# Patient Record
Sex: MALE | Race: WHITE
[De-identification: names, ages, dates, MRNs, and addresses within clinical notes are randomized per-mention and may not be internally consistent; named-entity substitution may affect disease eponyms.]

---

## 2020-03-02 ENCOUNTER — HOSPITAL ENCOUNTER (EMERGENCY)
Dept: HOSPITAL 5 - ED | Age: 35
LOS: 4 days | Discharge: PSYCHIATRIC OR SUBSTANCE USE FACILITY | End: 2020-03-06
Payer: SELF-PAY

## 2020-03-02 DIAGNOSIS — R45.851: ICD-10-CM

## 2020-03-02 DIAGNOSIS — F41.9: ICD-10-CM

## 2020-03-02 DIAGNOSIS — F32.9: Primary | ICD-10-CM

## 2020-03-02 LAB
ALBUMIN SERPL-MCNC: 4.9 G/DL (ref 3.9–5)
ALT SERPL-CCNC: 15 UNITS/L (ref 7–56)
BASOPHILS # (AUTO): 0 K/MM3 (ref 0–0.1)
BASOPHILS NFR BLD AUTO: 0.6 % (ref 0–1.8)
BENZODIAZEPINES SCREEN,URINE: (no result)
BILIRUB UR QL STRIP: (no result)
BLOOD UR QL VISUAL: (no result)
BUN SERPL-MCNC: 14 MG/DL (ref 9–20)
BUN/CREAT SERPL: 16 %
CALCIUM SERPL-MCNC: 9.7 MG/DL (ref 8.4–10.2)
EOSINOPHIL # BLD AUTO: 0.1 K/MM3 (ref 0–0.4)
EOSINOPHIL NFR BLD AUTO: 1 % (ref 0–4.3)
HCT VFR BLD CALC: 43.3 % (ref 35.5–45.6)
HEMOLYSIS INDEX: 28
HGB BLD-MCNC: 14.4 GM/DL (ref 11.8–15.2)
LYMPHOCYTES # BLD AUTO: 1.7 K/MM3 (ref 1.2–5.4)
LYMPHOCYTES NFR BLD AUTO: 30.9 % (ref 13.4–35)
MCHC RBC AUTO-ENTMCNC: 33 % (ref 32–34)
MCV RBC AUTO: 90 FL (ref 84–94)
METHADONE SCREEN,URINE: (no result)
MONOCYTES # (AUTO): 0.4 K/MM3 (ref 0–0.8)
MONOCYTES % (AUTO): 7.3 % (ref 0–7.3)
MUCOUS THREADS #/AREA URNS HPF: (no result) /HPF
OPIATE SCREEN,URINE: (no result)
PH UR STRIP: 7 [PH] (ref 5–7)
PLATELET # BLD: 270 K/MM3 (ref 140–440)
PROT UR STRIP-MCNC: (no result) MG/DL
RBC # BLD AUTO: 4.81 M/MM3 (ref 3.65–5.03)
RBC #/AREA URNS HPF: 4 /HPF (ref 0–6)
UROBILINOGEN UR-MCNC: < 2 MG/DL (ref ?–2)
WBC #/AREA URNS HPF: 1 /HPF (ref 0–6)

## 2020-03-02 PROCEDURE — 85025 COMPLETE CBC W/AUTO DIFF WBC: CPT

## 2020-03-02 PROCEDURE — 36415 COLL VENOUS BLD VENIPUNCTURE: CPT

## 2020-03-02 PROCEDURE — G0480 DRUG TEST DEF 1-7 CLASSES: HCPCS

## 2020-03-02 PROCEDURE — 82550 ASSAY OF CK (CPK): CPT

## 2020-03-02 PROCEDURE — 81001 URINALYSIS AUTO W/SCOPE: CPT

## 2020-03-02 PROCEDURE — 80053 COMPREHEN METABOLIC PANEL: CPT

## 2020-03-02 PROCEDURE — 80307 DRUG TEST PRSMV CHEM ANLYZR: CPT

## 2020-03-02 PROCEDURE — 80320 DRUG SCREEN QUANTALCOHOLS: CPT

## 2020-03-02 NOTE — EMERGENCY DEPARTMENT REPORT
ED Psych HPI





- General


Chief Complaint: Psych


Stated Complaint: DEPRESSION, THOUGHTS OF HURTING MYSELF


Time Seen by Provider: 03/02/20 12:39


Source: patient


Mode of arrival: Ambulatory





- History of Present Illness


Initial Comments: 





Patient is 35 years old male with history of depression.  Patient presented to 

the ER accompanied by his girlfriend for evaluation of severe depression and 

suicidal ideation.  Patient girlfriend stated that he told her last night that 

he wanted to hang himself.  Patient has been treated for depression since 

November.  Patient admitted that he is thinking about killing himself.  Patient 

denied any homicidal ideation.  No auditory or visual hallucination.


MD Complaint: suicidal ideation, feels depressed


-: days(s)


Associated Psychiatric Symptoms: depression, suicidal ideation


History of same: Yes


Worsens With: none


Associated Symptoms: denies other symptoms


Treatments Prior to Arrival: none


If Self Harm: admits thoughts of, has plan, self-inflicted trauma





- Related Data


                                Home Medications











 Medication  Instructions  Recorded  Confirmed  Last Taken


 


Cannabidiol (Cbd) Extract 600 mg PO QDAY 03/02/20 03/02/20 03/01/20 20:00


 


Paroxetine 20 mg PO QDAY 03/02/20 03/02/20 03/01/20 20:00











                                    Allergies











Allergy/AdvReac Type Severity Reaction Status Date / Time


 


No Known Allergies Allergy   Unverified 03/02/20 11:57














ED Review of Systems


ROS: 


Stated complaint: DEPRESSION, THOUGHTS OF HURTING MYSELF


Other details as noted in HPI





Comment: All other systems reviewed and negative


Constitutional: denies: chills, diaphoresis, fever


Respiratory: denies: cough, shortness of breath


Cardiovascular: denies: chest pain, palpitations


Gastrointestinal: denies: abdominal pain, nausea, vomiting


Musculoskeletal: denies: back pain


Neurological: denies: headache, weakness


Psychiatric: depression, suicidal thoughts.  denies: auditory hallucinations, 

visual hallucinations, homicidal thoughts





ED Past Medical Hx





- Past Medical History


Previous Medical History?: Yes


Hx Psychiatric Treatment: Yes (Anxiety/Depression/OCD)





- Surgical History


Past Surgical History?: Yes


Additional Surgical History: Nose surgery





- Social History


Smoking Status: Never Smoker


Substance Use Type: None





- Medications


Home Medications: 


                                Home Medications











 Medication  Instructions  Recorded  Confirmed  Last Taken  Type


 


Cannabidiol (Cbd) Extract 600 mg PO QDAY 03/02/20 03/02/20 03/01/20 20:00 

History


 


Paroxetine 20 mg PO QDAY 03/02/20 03/02/20 03/01/20 20:00 History














ED Physical Exam





- General


Limitations: Language Barrier


General appearance: alert, in no apparent distress, other (Patient looks very 

depressed.)





- Head


Head exam: Present: atraumatic, normocephalic, normal inspection





- Eye


Eye exam: Present: normal appearance





- ENT


ENT exam: Present: normal exam, normal orophraynx, mucous membranes moist





- Neck


Neck exam: Present: normal inspection, full ROM.  Absent: tenderness, 

meningismus, lymphadenopathy, thyromegaly





- Respiratory


Respiratory exam: Present: normal lung sounds bilaterally





- Cardiovascular


Cardiovascular Exam: Present: regular rate, normal rhythm, normal heart sounds





- GI/Abdominal


GI/Abdominal exam: Present: soft, normal bowel sounds.  Absent: distended, 

tenderness, guarding, rebound, rigid, organomegaly, mass, bruit, pulsatile mass,

hernia





- Extremities Exam


Extremities exam: Present: normal inspection, full ROM, normal capillary refill.

 Absent: pedal edema, calf tenderness





- Back Exam


Back exam: Present: normal inspection, full ROM.  Absent: CVA tenderness (R), 

CVA tenderness (L)





- Neurological Exam


Neurological exam: Present: alert, oriented X3, CN II-XII intact, normal gait, 

reflexes normal





- Psychiatric


Psychiatric exam: Present: depressed, suicidal ideation.  Absent: agitated, 

anxious, flat affect, manic, homicidal ideation





- Skin


Skin exam: Present: warm, intact, normal color





ED Course


                                   Vital Signs











  03/02/20 03/02/20 03/02/20





  12:39 15:06 19:58


 


Temperature 98.1 F  97.9 F


 


Pulse Rate 67  72


 


Respiratory 18 18 16





Rate   


 


Blood Pressure 116/75  


 


Blood Pressure   106/69





[Right]   


 


O2 Sat by Pulse 100 18 L 98





Oximetry   














  03/02/20 03/03/20 03/03/20





  20:16 02:08 08:32


 


Temperature  97.8 F 98.3 F


 


Pulse Rate  44 L 67


 


Respiratory 16 18 15





Rate   


 


Blood Pressure   


 


Blood Pressure  105/55 110/69





[Right]   


 


O2 Sat by Pulse 98 99 98





Oximetry   














  03/03/20 03/04/20 03/04/20





  19:50 02:15 08:46


 


Temperature 98.3 F 97.7 F 97.6 F


 


Pulse Rate 66 47 L 100 H


 


Respiratory 16 16 





Rate   


 


Blood Pressure   


 


Blood Pressure 107/57 104/59 116/64





[Right]   


 


O2 Sat by Pulse 97 99 100





Oximetry   














  03/04/20 03/04/20 03/04/20





  08:54 15:00 17:48


 


Temperature  97.5 F L 


 


Pulse Rate  57 L 88


 


Respiratory 16 18 19





Rate   


 


Blood Pressure   


 


Blood Pressure  112/68 114/76





[Right]   


 


O2 Sat by Pulse 100 99 100





Oximetry   














  03/04/20 03/04/20 03/05/20





  20:00 20:23 01:15


 


Temperature  97.8 F 98.0 F


 


Pulse Rate  77 86


 


Respiratory 18 18 20





Rate   


 


Blood Pressure   


 


Blood Pressure  100/59 108/61





[Right]   


 


O2 Sat by Pulse  98 98





Oximetry   














  03/05/20 03/05/20 03/05/20





  09:00 16:14 19:47


 


Temperature 97.5 F L 98.2 F 98.1 F


 


Pulse Rate 57 L 75 68


 


Respiratory 18 20 18





Rate   


 


Blood Pressure   


 


Blood Pressure 112/68 98/66 93/60





[Right]   


 


O2 Sat by Pulse 99 99 99





Oximetry   














  03/05/20 03/06/20 03/06/20





  19:58 01:00 07:00


 


Temperature  97.9 F 98.3 F


 


Pulse Rate  70 63


 


Respiratory 18 20 18





Rate   


 


Blood Pressure   


 


Blood Pressure  105/65 106/60





[Right]   


 


O2 Sat by Pulse 100 100 99





Oximetry   














ED Medical Decision Making





- Lab Data


Result diagrams: 


                                 03/02/20 13:00





                                 03/02/20 13:00


Critical care attestation.: 


If time is entered above; I have spent that time in minutes in the direct care 

of this critically ill patient, excluding procedure time.








ED Disposition


Clinical Impression: 


 Depression, Suicidal ideation





Disposition: [id number]/TX-[id number] PSY HOSP/PSY UNIT


Is pt being admited?: No


Condition: Stable


Referrals: 


PRIMARY CARE,MD [Primary Care Provider] - 3-5 Days

## 2020-03-04 RX ADMIN — SERTRALINE SCH MG: 25 TABLET, FILM COATED ORAL at 15:16

## 2020-03-04 RX ADMIN — DOXEPIN HYDROCHLORIDE SCH MG: 10 CAPSULE ORAL at 22:23

## 2020-03-04 NOTE — CONSULTATION
History of Present Illness





- Reason for Consult


Consult date: 03/04/20


Reason for consult: SI





- History of Present Psychiatric Illness


Kenan Lauren is a 34y/o male patient who presented to the ER for suicidal and 

homicidal ideation with a plan to hang himself. During my interview with the 

patient today, he is a/o x 3. He makes poor eye contact. He is calm and coopera

tive. His girlfriend is with him at bedside. The patient is led to a private 

room, but requests his girlfriend is with him in the room during the interview. 

The patient states he "wants to kill himself and other people." The patient says

"he's depressed and was thinking about hanging myself." His girlfriend says 

"he's been getting worse, and having a fear that he's going to attack someone." 

She says the patient "got like this after taking paroxetine."  The girlfriend 

says the patient "doesn't know what's real or whats false." She says the patient

"does not sleep and has a poor appetite." The patient says he "hasn't seen a 

psychiatrist since being here but saw one in [place]." They say the patient was 

being treated for "depression, anxiety and panic attacks."





PAST PSYCHIATRIC HISTORY: 


Diagnoses: depression, anxiety and panic attacks


Suicide attempts or Self-harm behavior: three times


Prior psychiatric hospitalizations: yes


Substance Abuse history: Denies


 Previous psychiatric medications tried: Paxil, seroquel 


Outpatient treatment: Denies, only in [place]





PAST MEDICAL HISTORY:  Two herniated discs





Family Psychiatric History


None reported or documented





SOCIAL HISTORY


Marital Status: Single


Living Arrangements: Alone


Employment Status:  Employed


Access to guns/weapons: Denies


Education: Some college


History of Abuse: Denies





ROS:


Constitutional: Negative for weight loss


ENT: Negative for stridor


Respiratory: Negative for cough or hemoptysis


All other systems reviewed and are negative





MENTAL STATUS


General Appearance: Dressed appropriately


Behavior: calm and cooperative, poor eye contact


Mood: "depressed"


Affect: Congruent


Thought Process: Goal-directed


Speech: Normal tone and pace


Suicidal Ideation: Yes


Homicidal Ideation: Yes


Hallucinations: Denies


Delusions: Yes


Insight and Judgment: Poor


Memory/Cognition: Fair





Diagnoses: Major Depressive Disorder, Severe w/o Psychotic Features





Plan


Zoloft 25mg po daily


Olanzapine 2.5mg po daily


Doxepin 10mg po qhs


Melatonin 5mg po qhs prn insomnia


Haldol 5mg IM q6h prn agitation


Vistaril 25mg po q6h prn anxiety


Medical: Per primary


Sitter: Defer to primary


Disposition: The patient meets the requirement for acute hospitalization at this

time. He may transfer to an acute psychiatric facility once medically cleared. 


Will continue to follow the patient until he is transferred or his condition 

improves enough for discharge


Please call with any questions or concerns. Thank you for this consult. 








Medications and Allergies


                                    Allergies











Allergy/AdvReac Type Severity Reaction Status Date / Time


 


No Known Allergies Allergy   Unverified 03/02/20 11:57











                                Home Medications











 Medication  Instructions  Recorded  Confirmed  Last Taken  Type


 


Cannabidiol (Cbd) Extract 600 mg PO QDAY 03/02/20 03/02/20 03/01/20 20:00 

History


 


Paroxetine 20 mg PO QDAY 03/02/20 03/02/20 03/01/20 20:00 History














Mental Status Exam





- Vital signs


                                Last Vital Signs











Temp  97.6 F   03/04/20 08:46


 


Pulse  100 H  03/04/20 08:46


 


Resp  16   03/04/20 08:54


 


BP  116/64   03/04/20 08:46


 


Pulse Ox  100   03/04/20 08:54














Results


Result Diagrams: 


                                 03/02/20 13:00





                                 03/02/20 13:00


All other labs normal.

## 2020-03-05 RX ADMIN — DOXEPIN HYDROCHLORIDE SCH MG: 10 CAPSULE ORAL at 22:09

## 2020-03-05 RX ADMIN — SERTRALINE SCH MG: 25 TABLET, FILM COATED ORAL at 10:05

## 2020-03-05 NOTE — PROGRESS NOTE
Subjective





- Reason for Consult


Consult date: 03/05/20


Reason for consult: suicidal





- Chief Complaint


Chief complaint: 


The patient's medical record was reviewed and the patient's progress was 

discussed with the nursing staff.





During my interview with the patient today, he is dressed appropriately. He is 

calm and cooperative. He makes poor eye contact. His girlfriend is with him 

translating, who the patient requests that she is present during the interview. 

He says he "feels better since taking the meds, but I have moments of 

depression." He says "the suicidal thoughts come and go." He also says "at times

the feeling that I might attack someone comes and goes." The patient says he 

"feels calm" but states "noises from other patient's irritates me." He says "the

medication made me sleep good." The patient says his appetite is "not great."





ROS:


Constitutional: Negative for weight loss


ENT: Negative for stridor


Respiratory: Negative for cough or hemoptysis


All other systems reviewed and are negative





MENTAL STATUS


General Appearance: Dressed appropriately


Behavior: calm and cooperative, poor eye contact


Mood: "feels better, moments of depression"


Affect: Congruent


Thought Process: Goal-directed


Speech: Normal tone and pace


Suicidal Ideation: Yes,  comes and goes


Homicidal Ideation: Yes, comes and goes


Hallucinations: Denies


Delusions: None elicited


Insight and Judgment: Poor


Memory/Cognition: Fair





Diagnoses: Major Depressive Disorder, Severe w/o Psychotic Features





Plan


Increased Olanzapine 5mg po daily


Medical: Per primary


Sitter: Defer to primary


Disposition: The patient meets the requirement for acute hospitalization at this

time. He may transfer to an acute psychiatric facility once medically cleared. 


Will continue to follow the patient until he is transferred or his condition 

improves enough for discharge


Please call with any questions or concerns. Thank you for this consult. 








Mental Status Exam





- Vital signs


                                Last Vital Signs











Temp  97.5 F L  03/05/20 09:00


 


Pulse  57 L  03/05/20 09:00


 


Resp  18   03/05/20 09:00


 


BP  112/68   03/05/20 09:00


 


Pulse Ox  99   03/05/20 09:00

## 2020-03-06 VITALS — DIASTOLIC BLOOD PRESSURE: 60 MMHG | SYSTOLIC BLOOD PRESSURE: 106 MMHG
